# Patient Record
Sex: MALE | URBAN - METROPOLITAN AREA
[De-identification: names, ages, dates, MRNs, and addresses within clinical notes are randomized per-mention and may not be internally consistent; named-entity substitution may affect disease eponyms.]

---

## 2021-12-01 ENCOUNTER — PATIENT OUTREACH (OUTPATIENT)
Dept: GERIATRIC MEDICINE | Facility: CLINIC | Age: 71
End: 2021-12-01

## 2021-12-01 NOTE — LETTER
December 14, 2021    NISH RENEE  [address]  [place] [address]    Dear  Nish,    Welcome to [facility] [place] Care Plus (MSC+) health program. My name is SARI Mcnally. I am your [facility] care coordinator. You are eligible for Care Coordination through [facility] MSC+ Product.    Here is how Care Coordination works:    I will meet with you in person to determine your care coordination needs    We will develop a plan of care to meet your needs    We will create a service plan showing the services you will receive    We will talk about and coordinate any preventive care needs you have    I will call you soon to see how you are doing and determine what needs you may have. Our goal is to keep you as healthy and independent as possible.     Soon, you will receive a new [facility] member identification (ID) card from [facility]. When you receive it, please use this card along with your [place] Health Care Programs card and Prescription Drug Coverage Program card. When you receive, it please use this card where you get your health services. If you have Medicare, you will need to show your Medicare card when you get health services.    Being in the [place] [facility] Plus (MSC+) Care Coordination program is voluntary and offered to you at no cost. If you ever wish to stop being in the Care Coordination program or have questions, call me at [phone]. If you reach my voice mail, leave a message and your phone number. If you are hearing impaired, please call the [place] Relay at 620 or [phone] (nevowj-sj-jwluim relay service).    Sincerely,        SARI Mcnally  [phone]  [email or web address]    [id number] accepted   (12/2019)

## 2021-12-14 NOTE — PROGRESS NOTES
[facility] Care Coordination Contact    Member became effective with  Jayjay on 12/1/21 with Ze[facility] MSC+.  Previous Health Plan: [place]  Previous Care System: [place] [facility]  Previous care coordinators name and number: None  Waiver Type: N/A  Last MMIS Entry: Date None, and Type None  MMIS visit date (and type) if different from above: Na   Services Listed in MMIS: Na  UTF received: No MMIS record to request UTF from.    Eugenio Lock  Care Management Specialist  [facility]  [phone]

## 2021-12-15 ENCOUNTER — PATIENT OUTREACH (OUTPATIENT)
Dept: GERIATRIC MEDICINE | Facility: CLINIC | Age: 71
End: 2021-12-15

## 2021-12-15 NOTE — PROGRESS NOTES
[facility] Care Coordination Contact    1st attempt - 12/15/21 - Called new member to schedule initial HRA home visit (via phone).  The phone number given by [facility] was a wrong number and the phone number listed in Epic was disconnected.  No other numbers available at this time.  No PCP on file.  No chart notes in Epic.      SARI Mcnally  [facility]  [phone]

## 2021-12-16 ENCOUNTER — PATIENT OUTREACH (OUTPATIENT)
Dept: GERIATRIC MEDICINE | Facility: CLINIC | Age: 71
End: 2021-12-16

## 2021-12-16 NOTE — PROGRESS NOTES
[facility] Care Coordination Contact    12/15/21 - Care Coordinator contacted Mayo Clancy, .  Care Coordinator asked Mayo if he knew which clinic Chioma would continue to go to.  Last reports show he went to [facility].  Mayo stated that Chioma is homeless and he typically just goes to a clinic that is close but stated that Chioma does spend a lot of time in the [place] area.  He stated he will ask Chioma which clinic he'd like to continue to go to and call Care Coordinator back.    12/16/21- Mayo Clancy called Care Coordinator back and left a return voicemail stating that Chioma wants to continue to go to the [facility].      Care Coordinator e-mailed Anita to let her know this information for transfer purposes.  Care Coordinator asked if any assessments need to be done at this time since his case will be transferred 1/1/22.    Alize Buitrago, SARI  [facility]  [phone]

## 2021-12-16 NOTE — PROGRESS NOTES
[facility] Care Coordination Contact    Called Huber -  to schedule annual HRA home visit. Left a message requesting a return call to schedule HRA.  Care Coordinator informed Huber that Chioma will be reassigned to the correct Care Coordinator (based on clinic) as of 1/1/22.  Chioma can complete an assessment with current Care Coordinator and can choose to wait until reassigned to new Care Coordinator as of 1/1/22.  Care Coordinator requested a return call.    SARI Mcnally  [facility]  [phone]

## 2021-12-17 ENCOUNTER — PATIENT OUTREACH (OUTPATIENT)
Dept: GERIATRIC MEDICINE | Facility: CLINIC | Age: 71
End: 2021-12-17

## 2021-12-17 NOTE — PROGRESS NOTES
[facility] Care Coordination Contact    Care Coordinator received a return voicemail from Mayo -  stating that Chioma would like to have his assessment completed this month with current Care Coordinator.  Asking Care Coordinator to call him back to schedule.    Called  - [facility] to schedule annual HRA home visit. Left a message requesting a return call to schedule HRA.     SARI Mcnally  [facility]  [phone]

## 2021-12-17 NOTE — PROGRESS NOTES
[facility] Care Coordination Contact    Called  Yvrose Bedolla  to schedule annual HRA home visit. HRA has been scheduled for Tuesday, December 21 at 10:30am via phone..     SARI Mcnally  [facility]  [phone]

## 2021-12-21 ENCOUNTER — PATIENT OUTREACH (OUTPATIENT)
Dept: GERIATRIC MEDICINE | Facility: CLINIC | Age: 71
End: 2021-12-21

## 2021-12-21 ASSESSMENT — ACTIVITIES OF DAILY LIVING (ADL): DEPENDENT_IADLS:: INDEPENDENT

## 2021-12-21 NOTE — PROGRESS NOTES
[facility] Care Coordination Contact      [facility] Health Plan or Product Change    CC received notification that member's health plan or health plan product changed from [place]/Fee For Service to [employer] MSC+ effective 12/1/2021.  CC contacted member and discussed change and face-to-face visit was offered. Member declined need for home visit. CC reviewed previous Health Risk Assessment/LTCC and POC with member and see POC and/or Health Risk Assessment/LTCC for changes.    Called member and introduced self as member s new CC. Confirmed with member that the welcome letter with writer's name and contact information has been received.  Reviewed LTCC/Health Risk Assessment (HRA) and POC with member. No changes noted.  Transitional HRA completed. Care Plan Summary updated and reflects current services.  Required referral authorization information communicated to CMS: No  Writer reviewed the following with member:  ER visits: No  Hospitalizations: No  TCU stays: No  Significant health status changes: Chioma is homeless and just was opened to .  He is in need of some formal services.  Care Coordinator will work on getting a bus card and home delivered meals set up for Chioma.  Falls/Injuries: No  ADL/IADL changes: No  Changes in services: Yes: See Above    Follow-Up Plan: Member informed of future contact, plan to f/u with member with at next regularly scheduled contact.  Contact information shared with member and family, encouraged member to call with any questions or concerns.  SARI Mcnally  [facility]  [phone]

## 2021-12-28 ENCOUNTER — PATIENT OUTREACH (OUTPATIENT)
Dept: GERIATRIC MEDICINE | Facility: CLINIC | Age: 71
End: 2021-12-28

## 2021-12-28 NOTE — PROGRESS NOTES
[facility] Care Coordination Contact    Received after visit chart from care coordinator.  Completed following tasks: Mailed copy of care plan to client.     Provider Signature - No POC Shared:  Member indicates that they do not want their POC shared with any [facility] providers.     Mailed POC Sig page to member with a stamped return envelope.    Emailed to [facility] [employer] to send member go-to 31 days unlimited [employer] bus pass. [facility] responded the bus pass will takes at least 14 days to mail out to member. Member will need to call [facility] monthly with this number [phone] to send the bus pass each month. [facility] does not automatically mail the bus card out anymore.    Eugenio Lock  Care Management Specialist  [facility]  [phone]

## 2021-12-28 NOTE — LETTER
December 28, 2021    NISH RENEE  [address]  [place] [address]        Dear Nish:    At [facility], we are dedicated to improving your health and well-being. Enclosed is the Comprehensive Care Plan that we developed with you on 12/21/21. Please review the Care Plan carefully.    As a reminder, some of the things we discussed at your visit include:    Your physical and mental health    Ways to reduce falls    Health care needs you may have    Don t forget to contact your care coordinator if you:    Have been hospitalized or plan to be hospitalized     Have had a fall     Have experienced a change in physical health    Are experiencing emotional problems     If you do not agree with your Care Plan, have questions about it, or have experienced a change in your needs, please call me at [phone]. If you are hearing impaired, please call the [place] Relay at 617 or [phone] (fqeteo-bo-havwjd relay service).    Sincerely,          SARI Mcnally  [phone]  [email or web address]    [facility]+[id number] [place] ([id number])     [id number] (11/18)

## 2025-04-07 ENCOUNTER — LAB REQUISITION (OUTPATIENT)
Dept: LAB | Facility: CLINIC | Age: 75
End: 2025-04-07

## 2025-04-07 DIAGNOSIS — E11.21 TYPE 2 DIABETES MELLITUS WITH DIABETIC NEPHROPATHY (H): ICD-10-CM

## 2025-04-09 LAB
ALBUMIN SERPL BCG-MCNC: 3.1 G/DL (ref 3.5–5.2)
ALP SERPL-CCNC: 85 U/L (ref 40–150)
ALT SERPL W P-5'-P-CCNC: 22 U/L (ref 0–70)
ANION GAP SERPL CALCULATED.3IONS-SCNC: 9 MMOL/L (ref 7–15)
AST SERPL W P-5'-P-CCNC: 19 U/L (ref 0–45)
BILIRUB SERPL-MCNC: 0.3 MG/DL
BUN SERPL-MCNC: 21.1 MG/DL (ref 8–23)
CALCIUM SERPL-MCNC: 8.8 MG/DL (ref 8.8–10.4)
CHLORIDE SERPL-SCNC: 107 MMOL/L (ref 98–107)
CREAT SERPL-MCNC: 0.94 MG/DL (ref 0.67–1.17)
EGFRCR SERPLBLD CKD-EPI 2021: 86 ML/MIN/1.73M2
GLUCOSE SERPL-MCNC: 137 MG/DL (ref 70–99)
HCO3 SERPL-SCNC: 25 MMOL/L (ref 22–29)
POTASSIUM SERPL-SCNC: 3.9 MMOL/L (ref 3.4–5.3)
PROT SERPL-MCNC: 5.6 G/DL (ref 6.4–8.3)
SODIUM SERPL-SCNC: 141 MMOL/L (ref 135–145)

## 2025-04-09 PROCEDURE — P9604 ONE-WAY ALLOW PRORATED TRIP: HCPCS | Performed by: FAMILY MEDICINE

## 2025-04-09 PROCEDURE — 36415 COLL VENOUS BLD VENIPUNCTURE: CPT | Performed by: FAMILY MEDICINE

## 2025-04-09 PROCEDURE — 80053 COMPREHEN METABOLIC PANEL: CPT | Performed by: FAMILY MEDICINE

## 2025-04-20 ENCOUNTER — LAB REQUISITION (OUTPATIENT)
Dept: LAB | Facility: CLINIC | Age: 75
End: 2025-04-20

## 2025-04-20 DIAGNOSIS — E11.22 TYPE 2 DIABETES MELLITUS WITH DIABETIC CHRONIC KIDNEY DISEASE (H): ICD-10-CM
